# Patient Record
Sex: MALE | Race: BLACK OR AFRICAN AMERICAN | NOT HISPANIC OR LATINO | Employment: FULL TIME | URBAN - METROPOLITAN AREA
[De-identification: names, ages, dates, MRNs, and addresses within clinical notes are randomized per-mention and may not be internally consistent; named-entity substitution may affect disease eponyms.]

---

## 2024-09-18 ENCOUNTER — OFFICE VISIT (OUTPATIENT)
Dept: URGENT CARE | Facility: CLINIC | Age: 34
End: 2024-09-18
Payer: COMMERCIAL

## 2024-09-18 VITALS
TEMPERATURE: 98 F | DIASTOLIC BLOOD PRESSURE: 74 MMHG | OXYGEN SATURATION: 97 % | HEART RATE: 86 BPM | BODY MASS INDEX: 31.17 KG/M2 | SYSTOLIC BLOOD PRESSURE: 117 MMHG | WEIGHT: 230.13 LBS | HEIGHT: 72 IN | RESPIRATION RATE: 14 BRPM

## 2024-09-18 DIAGNOSIS — K21.9 CHEST PAIN DUE TO GERD: Primary | ICD-10-CM

## 2024-09-18 DIAGNOSIS — G56.02 ACUTE CARPAL TUNNEL SYNDROME, LEFT: ICD-10-CM

## 2024-09-18 DIAGNOSIS — H65.113 ACUTE ALLERGIC SEROUS OTITIS MEDIA OF BOTH EARS: ICD-10-CM

## 2024-09-18 DIAGNOSIS — J30.89 NON-SEASONAL ALLERGIC RHINITIS DUE TO OTHER ALLERGIC TRIGGER: ICD-10-CM

## 2024-09-18 DIAGNOSIS — R07.9 CHEST PAIN DUE TO GERD: Primary | ICD-10-CM

## 2024-09-18 LAB
OHS QRS DURATION: 82 MS
OHS QTC CALCULATION: 419 MS

## 2024-09-18 PROCEDURE — 99203 OFFICE O/P NEW LOW 30 MIN: CPT | Mod: S$GLB,,, | Performed by: PHYSICIAN ASSISTANT

## 2024-09-18 PROCEDURE — 93010 ELECTROCARDIOGRAM REPORT: CPT | Mod: S$GLB,,, | Performed by: INTERNAL MEDICINE

## 2024-09-18 PROCEDURE — 93005 ELECTROCARDIOGRAM TRACING: CPT | Mod: S$GLB,,, | Performed by: PHYSICIAN ASSISTANT

## 2024-09-18 RX ORDER — IBUPROFEN 800 MG/1
800 TABLET ORAL EVERY 6 HOURS PRN
Qty: 30 TABLET | Refills: 0 | Status: SHIPPED | OUTPATIENT
Start: 2024-09-18 | End: 2024-09-28

## 2024-09-18 RX ORDER — CETIRIZINE HYDROCHLORIDE 10 MG/1
10 TABLET ORAL DAILY PRN
Qty: 30 TABLET | Refills: 0 | Status: SHIPPED | OUTPATIENT
Start: 2024-09-18 | End: 2024-10-18

## 2024-09-18 RX ORDER — PANTOPRAZOLE SODIUM 40 MG/1
40 TABLET, DELAYED RELEASE ORAL DAILY
Qty: 30 TABLET | Refills: 0 | Status: SHIPPED | OUTPATIENT
Start: 2024-09-18 | End: 2024-10-18

## 2024-09-18 RX ORDER — AZELASTINE HYDROCHLORIDE, FLUTICASONE PROPIONATE 137; 50 UG/1; UG/1
1 SPRAY, METERED NASAL 2 TIMES DAILY PRN
Qty: 23 G | Refills: 0 | Status: SHIPPED | OUTPATIENT
Start: 2024-09-18 | End: 2024-10-18

## 2024-09-18 NOTE — LETTER
"  September 18, 2024      [facility] [place]  [facility] [employer] [facility] [address]  Phone: [phone]  Fax: [phone]       Patient: Bhupinder Lopez   YOB: 1990  Date of Visit: 09/18/2024    To Whom It May Concern:    Anastasiya oLpez  was at [facility] on 09/18/2024. The patient may return to work/school on 9/18/24 with no restrictions. If you have any questions or concerns, or if I can be of further assistance, please do not hesitate to contact me.    Sincerely,        Adrianameir Rivera PA-C (Jackie)       "

## 2024-09-18 NOTE — PROGRESS NOTES
Subjective:      Patient ID: Bhupinder Lopez is a 33 y.o. male.    Vitals:  height is 6' (1.829 m) and weight is 104.4 kg (230 lb 1.6 oz). His oral temperature is 98.1 °F (36.7 °C). His blood pressure is 117/74 and his pulse is 86. His respiration is 14 and oxygen saturation is 97%.     Chief Complaint: Chest Pain    Bhupinder Lopez is a 33 y.o. male with hx of allergic rhinitis and GERD who complains of chest pain and reports that he was concerned with his BP. Pt states that the chest pain is more like a heartburn but it is sharp pain that happens almost daily. Pt reports two days ago he got dizzy out of no where. Pt states sometimes he would feel numbness in fingers. Pt reports that he cut back on smoking cigarettes ( 6 cigarettes a day for 10-12 packs).  He reports he eats around 5 to 6 pm, tried protonix and OTC antacids, and avoids triggers like coffee, sliver, and spicy foods.He drinks tea to avoid episodes.      He reports numbness to fingers in left hand and left elbow this week; plays golf often and works on a computer frequently. He was seen by GI before.  He is going to [place] today.   He takes zyrtec, flonase, and pseudoephedrine prn allergies. He needs more flonase.    Chest Pain   This is a recurrent problem. The current episode started yesterday. The onset quality is gradual. The problem occurs constantly. The problem has been unchanged. The quality of the pain is described as pressure. The pain does not radiate. Associated symptoms include dizziness, headaches and numbness. Pertinent negatives include no abdominal pain, back pain, claudication, cough, diaphoresis, exertional chest pressure, fever, hemoptysis, irregular heartbeat, leg pain, lower extremity edema, malaise/fatigue, nausea, near-syncope, orthopnea, palpitations, PND, shortness of breath, sputum production, syncope, vomiting or weakness. The pain is aggravated by nothing. Treatments tried: Tylenol , BC, and antiacid. The treatment provided  mild relief. Risk factors include male gender and smoking/tobacco exposure.   Pertinent negatives for past medical history include no aneurysm and no muscle weakness.   Pertinent negatives for family medical history include: no CAD, no heart disease, no hypertension, no stroke, no sudden death and no TIA.   Gastroesophageal Reflux  He complains of chest pain, heartburn and water brash. He reports no abdominal pain, no belching, no choking, no coughing, no dysphagia, no early satiety, no globus sensation, no hoarse voice, no nausea, no sore throat, no stridor, no tooth decay or no wheezing. This is a recurrent problem. The current episode started more than 1 year ago. The problem occurs constantly. The problem has been unchanged. The heartburn duration is several minutes. The heartburn is located in the left chest and substernum. The heartburn is of moderate intensity. The heartburn does not wake him from sleep. The heartburn does not limit his activity. The heartburn doesn't change with position. The symptoms are aggravated by caffeine, smoking and certain foods. Pertinent negatives include no anemia, fatigue, melena, muscle weakness, orthopnea or weight loss. Risk factors include smoking/tobacco exposure and caffeine use. He has tried an antacid for the symptoms. The treatment provided mild relief.       Constitution: Negative for activity change, appetite change, chills, sweating, fatigue, fever and generalized weakness.   HENT:  Positive for congestion and postnasal drip. Negative for ear pain, ear discharge, foreign body in ear, tinnitus, sinus pain, sinus pressure, sore throat, trouble swallowing and voice change.    Cardiovascular:  Positive for chest pain. Negative for leg swelling, palpitations, sob on exertion and passing out.   Respiratory:  Negative for cough, sputum production, bloody sputum, shortness of breath and wheezing.    Gastrointestinal:  Positive for heartburn. Negative for abdominal pain,  abdominal bloating, nausea, vomiting and bowel incontinence.   Musculoskeletal:  Negative for back pain, muscle cramps and muscle ache.   Allergic/Immunologic: Positive for environmental allergies, seasonal allergies and recurrent sinus infections.   Neurological:  Positive for dizziness, headaches and numbness.   Psychiatric/Behavioral:  Negative for nervous/anxious. The patient is not nervous/anxious.       Objective:     Physical Exam   Constitutional: He is oriented to person, place, and time. He is cooperative. No distress.      Comments:Patient is awake and alert, sitting up in exam chair, speaking and answering in complete sentences   normal  HENT:   Head: Normocephalic and atraumatic.      Comments: Patient observed breathing through his mouth and frequently clearing his throat.    Ears:   Right Ear: External ear and ear canal normal. A middle ear effusion is present.   Left Ear: External ear and ear canal normal. A middle ear effusion is present.   Nose: Congestion present. No mucosal edema, rhinorrhea or sinus tenderness. Right sinus exhibits no maxillary sinus tenderness and no frontal sinus tenderness. Left sinus exhibits no maxillary sinus tenderness and no frontal sinus tenderness.   Mouth/Throat: Uvula is midline, oropharynx is clear and moist and mucous membranes are normal. Mucous membranes are moist. No oropharyngeal exudate or posterior oropharyngeal erythema. Tonsils are 0 on the right. Tonsils are 0 on the left. Oropharynx is clear.      Comments:  postnasal discharge noted on the posterior pharyngeal wall    Eyes: Conjunctivae are normal. Pupils are equal, round, and reactive to light. Extraocular movement intact   Neck: Neck supple.   Cardiovascular: Normal rate, regular rhythm, normal heart sounds and normal pulses.   Pulmonary/Chest: Effort normal and breath sounds normal. No respiratory distress. He has no wheezes. He has no rhonchi. He has no rales.   Abdominal: Normal appearance. He  exhibits no distension and no mass. There is no abdominal tenderness. There is no rebound, no guarding, no left CVA tenderness and no right CVA tenderness. No hernia.   Musculoskeletal: Normal range of motion.         General: Normal range of motion.      Cervical back: He exhibits no tenderness.      Right hand: Normal.      Left hand: He exhibits normal range of motion, normal two-point discrimination, normal capillary refill and no swelling. Normal sensation noted.   Lymphadenopathy:     He has no cervical adenopathy.   Neurological: He is alert and oriented to person, place, and time.   Skin: Skin is warm. Capillary refill takes less than 2 seconds.   Psychiatric: His behavior is normal. Mood, judgment and thought content normal.   Nursing note and vitals reviewed.      Assessment:     1. Chest pain due to GERD    2. Non-seasonal allergic rhinitis due to other allergic trigger    3. Acute allergic serous otitis media of both ears    4. Acute carpal tunnel syndrome, left      Patient presents with clinical exam findings and history consistent with above.      On exam, patient is nontoxic appearing and vitals are stable.      Diagnostic testing results were reviewed and discussed with patient/guardian.   Tests ordered in clinic:   Results for orders placed or performed in visit on 09/18/24   IN OFFICE EKG 12-LEAD (to [place])    Collection Time: 09/18/24  9:25 AM   Result Value Ref Range    QRS Duration 82 ms    [facility] QTC Calculation 419 ms    Narrative    Test Reason : R07.89,    Vent. Rate : 066 BPM     Atrial Rate : 066 BPM     P-R Int : 144 ms          QRS Dur : 082 ms      QT Int : 400 ms       P-R-T Axes : 030 009 001 degrees     QTc Int : 419 ms    Normal sinus rhythm  Normal ECG  No previous ECGs available  Confirmed by DEBBIE HOPPER MD ([id number]) on 9/18/2024 11:03:17 AM    Referred By:             Confirmed By:DEBBIE HOPPER MD       Previous progress notes/admissions/labs and medications were  reviewed.      Plan:   Goals of medications are to: Relieve heartburn (by taking an *antacid* as needed), Prevent reflux (take a H2 blocker *ZANTAC/PEPCID(*  30 minutes before meal) , Lessen acid production (take a PPI *Pantoprazole* daily)       Chest pain due to GERD  -     IN OFFICE EKG 12-LEAD (to [place])  -     pantoprazole (PROTONIX) 40 MG tablet; Take 1 tablet (40 mg total) by mouth once daily.  Dispense: 30 tablet; Refill: 0  -     Ambulatory referral/consult to Cardiology    Non-seasonal allergic rhinitis due to other allergic trigger  -     azelastine-fluticasone (DYMISTA) 137-50 mcg/spray Spry nassal spray; 1 spray by Each Nostril route 2 (two) times daily as needed (runny nose and congestion).  Dispense: 23 g; Refill: 0  -     cetirizine (ZYRTEC) 10 MG tablet; Take 1 tablet (10 mg total) by mouth daily as needed for Rhinitis or Allergies.  Dispense: 30 tablet; Refill: 0    Acute allergic serous otitis media of both ears  -     azelastine-fluticasone (DYMISTA) 137-50 mcg/spray Spry nassal spray; 1 spray by Each Nostril route 2 (two) times daily as needed (runny nose and congestion).  Dispense: 23 g; Refill: 0  -     cetirizine (ZYRTEC) 10 MG tablet; Take 1 tablet (10 mg total) by mouth daily as needed for Rhinitis or Allergies.  Dispense: 30 tablet; Refill: 0    Acute carpal tunnel syndrome, left  -     ibuprofen (ADVIL,MOTRIN) 800 MG tablet; Take 1 tablet (800 mg total) by mouth every 6 (six) hours as needed for Pain or Temperature greater than.  Dispense: 30 tablet; Refill: 0  -     Ambulatory referral/consult to Orthopedics                    1) See orders for this visit as documented in the electronic medical record.  2) Symptomatic therapy suggested: use acetaminophen/ibuprofen every 6-8 hours prn pain or fever, push fluids.   3) Call or return to clinic prn if these symptoms worsen or fail to improve as anticipated.    Discussed results/diagnosis/plan with patient in clinic.  We had shared decision  "making for patient's treatment. Patient verbalized understanding and in agreement with current treatment plan.     Patient was instructed to return for re-evaluation with urgent care or PCP for continued outpatient workup and management if symptoms do not improve/worsening symptoms. Strict ED versus clinic precautions given in depth.    Discharge and follow-up instructions given verbally/printed with the patient who expressed understanding. [facility] and [facility]              Adriana "Caroline" JAZMIN Rivera          Patient Instructions   Goals of medications are to: Relieve heartburn (by taking an *antacid* as needed), Prevent reflux (take a H2 blocker *ZANTAC/PEPCID(*  30 minutes before meal) , Lessen acid production (take a PPI *Pantoprazole* daily)    Antacids neutralize stomach acid to cut down on heartburn, sour stomach, acid indigestion, and stomach upset. Some antacids also contain simethicone, an ingredient that helps your body get rid of gas. Some antacids contain ingredients that can cause diarrhea, such as magnesium, or constipation, such as aluminum.    Examples of antacids include:  Aluminum hydroxide gel   Calcium carbonate (Massiel-Hazel Green, Tums)  Magnesium hydroxide (Milk of Magnesia)   Gaviscon, Gelusil, Maalox, Mylanta, Rolaids  Pepto-Bismol  Aluminum and magnesium antacids work quickly to lower the acid in the stomach. These are available as Maalox and Mylanta.       Acid Reducers for Heartburn  There are two types of drugs that cut down on the production of acid in the stomach: histamine antagonists (H2 antagonists or H2 blockers) and proton pump inhibitors (PPIs).     Histamine antagonists (H2 antagonists or H2 blockers)  H2 blockers can generally relieve heartburn and treat reflux, especially if youve never had treatment before. These drugs are particularly useful at alleviating heartburn but may not be as good for treating esophagitis (inflammation that occurs in " the esophagus) that is the result of GERD.Histamine stimulates acid production, especially after meals, so H2 blockers are best taken 30 minutes before meals. They can also be taken at bedtime to suppress nighttime production of acid.Side effects can include headache, abdominal pain, diarrhea, nausea, gas, sore throat, runny nose, and dizziness.     Examples of H2 blockers available over the counter include:  Cimetidine (Tagamet HB)  Famotidine (Pepcid AC, Zantac 360)  Nizatidine (Axid, Axid AR)    Proton pump inhibitors.   Depending on the source of your heartburn or reflux, your doctor can prescribe drugs that block acid production more effectively and for a longer period of time than the H2 blockers, namely the family of medications doctors call proton pump inhibitors. PPIs are best taken an hour before meals. They include:    Dexlansoprazole (Dexilant)  Esomeprazole (Nexium)  Lansoprazole (Prevacid)  Omeprazole (Prilosec, Zegerid)  Pantoprazole (Protonix)  Rabeprazole (Aciphex)    Most doctors do not believe that one drug is significantly more effective than the others in managing GERD. These medications are also good for protecting the esophagus from acid so that esophageal inflammation can heal.Side effects can include headache, diarrhea, abdominal pain, bloating, constipation, nausea, and gas.      Lifestyle measures  Raise the head of your bed by 6 to 8 inches (15 to 20 cm). Use wood or rubber blocks under 2 legs or try a foam wedge under your mattress. Just sleeping with your head raised on pillows is not enough.  Avoid beer, wine, and mixed drinks and avoid caffeine.  Keep a healthy weight. If you are too heavy, lose weight.  If you smoke, try to quit. Your doctor or nurse can help.  Keep a diary of your signs. Write down what you had to eat before you had reflux. This will help you learn which foods cause you problems. For some people, they need to avoid coffee, chocolate, alcohol, spicy or fatty foods,  or peppermint.  Avoid eating for 2 to 3 hours before bedtime. Lying down after you eat can make reflux worse.  Avoid belts and clothes that are too tight.      Tips on how to reduce ear pain on airplane  Swallowing and yawning: Swallowing can help open the Eustachian tube and equalize the pressure in your ear. Try chewing gum, sucking on hard candy, or sipping water during takeoff and landing to encourage swallowing. Yawning can also help. Try yawning intentionally or opening your mouth wide as if you were yawning.  Pinching your nose and blowing gently: Pinch your nostrils closed with your fingers and then gently blow air into your nose. This can help open the Eustachian tube and equalize the pressure in your ear.  Using earplugs or special ear pressure-regulating device  If your ear pain is severe, you may have to reschedule your flight.     --------------------------------------------------------------------------------------------------------------------------------------------------------    Recommend oral antihistamine (Claritin/zyrtec) +/- oral decongestant (pseudoephedrine) for rhinorrhea, steroid nasal spray (flonase) +/- antihistamine nasal spray (azelastine)=Dymista  Pseudoephedrine Oral: Immediate release: 60 mg every 4 to 6 hours; Extended release: 120 mg every 12 hours or 240 mg every 24 hours; maximum: 240 mg per 24 hours.     For nose bleeds; recommend nasal irrigation with a saline spray/gel (AYR nasal gel) or Netti Pot like device per their directions is also recommended.  Please drink plenty of fluids.  Please get plenty of rest.  If you  smoke, please stop smoking.      Discussed prescriptions and over-the-counter medicines to help with patient's symptoms:  A steroid nose spray (flonase) can help with a stuffy nose. It can also help with drainage down the back of your throat.  An antihistamine (loratadine,zyrtec,allegra, xyzal) can help with itching, sneezing, or runny nose.  An antihistamine eye  drop can help with itchy eyes.  A decongestant (pseudoephedrine,  Phenylephrine) can help with a stuffy nose. Take <10 days for congestion and rhinorrhea. Once symptoms improve, proceed with loratadine/zyrtec once a day. These ingredients can keep you up all night, decrease appetite, feel jittery, and raise blood pressure with long term use.    Please remember that you have received care at an urgent care today. Urgent cares are not emergency rooms and are not equipped to handle life threatening emergencies and cannot rule in or out certain medical conditions and you may be released before all of your medical problems are known or treated. Please arrange follow up with your primary care physician or speciality clinic  within 2-5 days if your signs and symptoms have not resolved or worsen. Patient can call our Referral Hotline at [phone] to make an appointment.    Please return here or go to the Emergency Department for any concerns or worsening of condition.Patient was educated on signs/symptoms that would warrant emergent medical attention. Patient verbalized understanding.  You have signs of a heart attack, which may include:  Severe chest pain, pressure, or discomfort with:  Breathing trouble, sweating, upset stomach, or cold, clammy skin.  Pain in your arms, back, or jaw.  Worse pain with activity like walking up stairs.  Fast or irregular heartbeat.  Feeling dizzy, faint, or weak.  You have sudden, severe belly pain or the belly pain is constant.  You have blood in the undigested food and acid that comes up, or stool that looks red, black, or like tar.  You feel like your food gets stuck or you have pain when you swallow.  You lose weight when you are not trying to.  You choke when you are eating.  Your reflux is very bad, very frequent, or not helped by over-the-counter medicines.  You keep throwing up.          ;

## 2024-09-18 NOTE — PATIENT INSTRUCTIONS
Goals of medications are to: Relieve heartburn (by taking an *antacid* as needed), Prevent reflux (take a H2 blocker *ZANTAC/PEPCID(*  30 minutes before meal) , Lessen acid production (take a PPI *Pantoprazole* daily)    Antacids neutralize stomach acid to cut down on heartburn, sour stomach, acid indigestion, and stomach upset. Some antacids also contain simethicone, an ingredient that helps your body get rid of gas. Some antacids contain ingredients that can cause diarrhea, such as magnesium, or constipation, such as aluminum.    Examples of antacids include:  Aluminum hydroxide gel   Calcium carbonate ([place], Tums)  Magnesium hydroxide (Milk of Magnesia)   Gaviscon, Gelusil, Maalox, Mylanta, Rolaids  Pepto-Bismol  Aluminum and magnesium antacids work quickly to lower the acid in the stomach. These are available as Maalox and Mylanta.       Acid Reducers for Heartburn  There are two types of drugs that cut down on the production of acid in the stomach: histamine antagonists (H2 antagonists or H2 blockers) and proton pump inhibitors (PPIs).     Histamine antagonists (H2 antagonists or H2 blockers)  H2 blockers can generally relieve heartburn and treat reflux, especially if youve never had treatment before. These drugs are particularly useful at alleviating heartburn but may not be as good for treating esophagitis (inflammation that occurs in the esophagus) that is the result of GERD.Histamine stimulates acid production, especially after meals, so H2 blockers are best taken 30 minutes before meals. They can also be taken at bedtime to suppress nighttime production of acid.Side effects can include headache, abdominal pain, diarrhea, nausea, gas, sore throat, runny nose, and dizziness.     Examples of H2 blockers available over the counter include:  Cimetidine (Tagamet HB)  Famotidine (Pepcid AC, Zantac 360)  Nizatidine (Axid, Axid AR)    Proton pump inhibitors.   Depending on the source of your heartburn or  reflux, your doctor can prescribe drugs that block acid production more effectively and for a longer period of time than the H2 blockers, namely the family of medications doctors call proton pump inhibitors. PPIs are best taken an hour before meals. They include:    Dexlansoprazole (Dexilant)  Esomeprazole (Nexium)  Lansoprazole (Prevacid)  Omeprazole (Prilosec, Zegerid)  Pantoprazole (Protonix)  Rabeprazole (Aciphex)    Most doctors do not believe that one drug is significantly more effective than the others in managing GERD. These medications are also good for protecting the esophagus from acid so that esophageal inflammation can heal.Side effects can include headache, diarrhea, abdominal pain, bloating, constipation, nausea, and gas.      Lifestyle measures  Raise the head of your bed by 6 to 8 inches (15 to 20 cm). Use wood or rubber blocks under 2 legs or try a foam wedge under your mattress. Just sleeping with your head raised on pillows is not enough.  Avoid beer, wine, and mixed drinks and avoid caffeine.  Keep a healthy weight. If you are too heavy, lose weight.  If you smoke, try to quit. Your doctor or nurse can help.  Keep a diary of your signs. Write down what you had to eat before you had reflux. This will help you learn which foods cause you problems. For some people, they need to avoid coffee, chocolate, alcohol, spicy or fatty foods, or peppermint.  Avoid eating for 2 to 3 hours before bedtime. Lying down after you eat can make reflux worse.  Avoid belts and clothes that are too tight.      Tips on how to reduce ear pain on airplane  Swallowing and yawning: Swallowing can help open the Eustachian tube and equalize the pressure in your ear. Try chewing gum, sucking on hard candy, or sipping water during takeoff and landing to encourage swallowing. Yawning can also help. Try yawning intentionally or opening your mouth wide as if you were yawning.  Pinching your nose and blowing gently: Pinch your  nostrils closed with your fingers and then gently blow air into your nose. This can help open the Eustachian tube and equalize the pressure in your ear.  Using earplugs or special ear pressure-regulating device  If your ear pain is severe, you may have to reschedule your flight.     --------------------------------------------------------------------------------------------------------------------------------------------------------    Recommend oral antihistamine (Claritin/zyrtec) +/- oral decongestant (pseudoephedrine) for rhinorrhea, steroid nasal spray (flonase) +/- antihistamine nasal spray (azelastine)=Dymista  Pseudoephedrine Oral: Immediate release: 60 mg every 4 to 6 hours; Extended release: 120 mg every 12 hours or 240 mg every 24 hours; maximum: 240 mg per 24 hours.     For nose bleeds; recommend nasal irrigation with a saline spray/gel (AYR nasal gel) or Netti Pot like device per their directions is also recommended.  Please drink plenty of fluids.  Please get plenty of rest.  If you  smoke, please stop smoking.      Discussed prescriptions and over-the-counter medicines to help with patient's symptoms:  A steroid nose spray (flonase) can help with a stuffy nose. It can also help with drainage down the back of your throat.  An antihistamine (loratadine,zyrtec,allegra, xyzal) can help with itching, sneezing, or runny nose.  An antihistamine eye drop can help with itchy eyes.  A decongestant (pseudoephedrine,  Phenylephrine) can help with a stuffy nose. Take <10 days for congestion and rhinorrhea. Once symptoms improve, proceed with loratadine/zyrtec once a day. These ingredients can keep you up all night, decrease appetite, feel jittery, and raise blood pressure with long term use.    Please remember that you have received care at an urgent care today. Urgent cares are not emergency rooms and are not equipped to handle life threatening emergencies and cannot rule in or out certain medical conditions and  you may be released before all of your medical problems are known or treated. Please arrange follow up with your primary care physician or speciality clinic  within 2-5 days if your signs and symptoms have not resolved or worsen. Patient can call our Referral Hotline at [phone] to make an appointment.    Please return here or go to the Emergency Department for any concerns or worsening of condition.Patient was educated on signs/symptoms that would warrant emergent medical attention. Patient verbalized understanding.  You have signs of a heart attack, which may include:  Severe chest pain, pressure, or discomfort with:  Breathing trouble, sweating, upset stomach, or cold, clammy skin.  Pain in your arms, back, or jaw.  Worse pain with activity like walking up stairs.  Fast or irregular heartbeat.  Feeling dizzy, faint, or weak.  You have sudden, severe belly pain or the belly pain is constant.  You have blood in the undigested food and acid that comes up, or stool that looks red, black, or like tar.  You feel like your food gets stuck or you have pain when you swallow.  You lose weight when you are not trying to.  You choke when you are eating.  Your reflux is very bad, very frequent, or not helped by over-the-counter medicines.  You keep throwing up.